# Patient Record
Sex: MALE | Race: WHITE
[De-identification: names, ages, dates, MRNs, and addresses within clinical notes are randomized per-mention and may not be internally consistent; named-entity substitution may affect disease eponyms.]

---

## 2019-12-02 ENCOUNTER — HOSPITAL ENCOUNTER (EMERGENCY)
Dept: HOSPITAL 84 - D.ER | Age: 44
Discharge: HOME | End: 2019-12-02
Payer: COMMERCIAL

## 2019-12-02 VITALS — WEIGHT: 265.56 LBS | HEIGHT: 71 IN | BODY MASS INDEX: 37.18 KG/M2

## 2019-12-02 VITALS — DIASTOLIC BLOOD PRESSURE: 81 MMHG | SYSTOLIC BLOOD PRESSURE: 133 MMHG

## 2019-12-02 DIAGNOSIS — I10: ICD-10-CM

## 2019-12-02 DIAGNOSIS — R20.2: Primary | ICD-10-CM

## 2019-12-02 DIAGNOSIS — Z79.84: ICD-10-CM

## 2019-12-02 DIAGNOSIS — E11.9: ICD-10-CM

## 2019-12-02 DIAGNOSIS — R07.89: ICD-10-CM

## 2019-12-02 LAB
ALBUMIN SERPL-MCNC: 3.5 G/DL (ref 3.4–5)
ALP SERPL-CCNC: 124 U/L (ref 46–116)
ALT SERPL-CCNC: 52 U/L (ref 10–68)
ANION GAP SERPL CALC-SCNC: 16.4 MMOL/L (ref 8–16)
APTT BLD: 27.9 SECONDS (ref 22.8–39.4)
BASOPHILS NFR BLD AUTO: 0.7 % (ref 0–2)
BILIRUB SERPL-MCNC: 0.29 MG/DL (ref 0.2–1.3)
BUN SERPL-MCNC: 18 MG/DL (ref 7–18)
CALCIUM SERPL-MCNC: 9.3 MG/DL (ref 8.5–10.1)
CHLORIDE SERPL-SCNC: 98 MMOL/L (ref 98–107)
CK MB SERPL-MCNC: 2 U/L (ref 0–3.6)
CK SERPL-CCNC: 185 UL (ref 21–232)
CO2 SERPL-SCNC: 26 MMOL/L (ref 21–32)
CREAT SERPL-MCNC: 1 MG/DL (ref 0.6–1.3)
EOSINOPHIL NFR BLD: 5.3 % (ref 0–7)
ERYTHROCYTE [DISTWIDTH] IN BLOOD BY AUTOMATED COUNT: 12.6 % (ref 11.5–14.5)
GLOBULIN SER-MCNC: 4.2 G/L
GLUCOSE SERPL-MCNC: 381 MG/DL (ref 74–106)
HCT VFR BLD CALC: 46.8 % (ref 42–54)
HGB BLD-MCNC: 15.8 G/DL (ref 13.5–17.5)
IMM GRANULOCYTES NFR BLD: 1.8 % (ref 0–5)
INR PPP: 0.93 (ref 0.85–1.17)
LYMPHOCYTES NFR BLD AUTO: 36.7 % (ref 15–50)
MAGNESIUM SERPL-MCNC: 1.8 MG/DL (ref 1.8–2.4)
MCH RBC QN AUTO: 30.9 PG (ref 26–34)
MCHC RBC AUTO-ENTMCNC: 33.8 G/DL (ref 31–37)
MCV RBC: 91.4 FL (ref 80–100)
MONOCYTES NFR BLD: 8.9 % (ref 2–11)
NEUTROPHILS NFR BLD AUTO: 46.6 % (ref 40–80)
OSMOLALITY SERPL CALC.SUM OF ELEC: 289 MOSM/KG (ref 275–300)
PLATELET # BLD: 272 10X3/UL (ref 130–400)
PMV BLD AUTO: 9.8 FL (ref 7.4–10.4)
POTASSIUM SERPL-SCNC: 4.4 MMOL/L (ref 3.5–5.1)
PROT SERPL-MCNC: 7.7 G/DL (ref 6.4–8.2)
PROTHROMBIN TIME: 11.9 SECONDS (ref 11.6–15)
RBC # BLD AUTO: 5.12 10X6/UL (ref 4.2–6.1)
SODIUM SERPL-SCNC: 136 MMOL/L (ref 136–145)
TROPONIN I SERPL-MCNC: < 0.017 NG/ML (ref 0–0.06)
WBC # BLD AUTO: 12.1 10X3/UL (ref 4.8–10.8)

## 2020-09-22 ENCOUNTER — HOSPITAL ENCOUNTER (INPATIENT)
Dept: HOSPITAL 84 - D.ER | Age: 45
LOS: 5 days | Discharge: HOME | DRG: 177 | End: 2020-09-27
Attending: FAMILY MEDICINE | Admitting: FAMILY MEDICINE
Payer: COMMERCIAL

## 2020-09-22 VITALS — DIASTOLIC BLOOD PRESSURE: 71 MMHG | SYSTOLIC BLOOD PRESSURE: 115 MMHG

## 2020-09-22 VITALS — SYSTOLIC BLOOD PRESSURE: 103 MMHG | DIASTOLIC BLOOD PRESSURE: 56 MMHG

## 2020-09-22 VITALS
HEIGHT: 71 IN | HEIGHT: 71 IN | BODY MASS INDEX: 37.1 KG/M2 | WEIGHT: 265 LBS | BODY MASS INDEX: 37.1 KG/M2 | WEIGHT: 265 LBS | BODY MASS INDEX: 37.1 KG/M2

## 2020-09-22 VITALS — DIASTOLIC BLOOD PRESSURE: 95 MMHG | SYSTOLIC BLOOD PRESSURE: 162 MMHG

## 2020-09-22 DIAGNOSIS — J12.89: ICD-10-CM

## 2020-09-22 DIAGNOSIS — I10: ICD-10-CM

## 2020-09-22 DIAGNOSIS — E11.9: ICD-10-CM

## 2020-09-22 DIAGNOSIS — U07.1: Primary | ICD-10-CM

## 2020-09-22 DIAGNOSIS — E66.9: ICD-10-CM

## 2020-09-22 DIAGNOSIS — J96.01: ICD-10-CM

## 2020-09-22 LAB
ALBUMIN SERPL-MCNC: 3.3 G/DL (ref 3.4–5)
ALP SERPL-CCNC: 74 U/L (ref 30–120)
ALT SERPL-CCNC: 67 U/L (ref 10–68)
ANION GAP SERPL CALC-SCNC: 13.2 MMOL/L (ref 8–16)
APTT BLD: 30.6 SECONDS (ref 22.8–39.4)
BASOPHILS NFR BLD AUTO: 0.3 % (ref 0–2)
BILIRUB SERPL-MCNC: 0.41 MG/DL (ref 0.2–1.3)
BUN SERPL-MCNC: 15 MG/DL (ref 7–18)
CALCIUM SERPL-MCNC: 8.9 MG/DL (ref 8.5–10.1)
CHLORIDE SERPL-SCNC: 98 MMOL/L (ref 98–107)
CK MB SERPL-MCNC: 0.9 U/L (ref 0–3.6)
CK SERPL-CCNC: 488 UL (ref 21–232)
CO2 SERPL-SCNC: 26.9 MMOL/L (ref 21–32)
CREAT SERPL-MCNC: 1 MG/DL (ref 0.6–1.3)
EOSINOPHIL NFR BLD: 0.2 % (ref 0–7)
ERYTHROCYTE [DISTWIDTH] IN BLOOD BY AUTOMATED COUNT: 12.5 % (ref 11.5–14.5)
GLOBULIN SER-MCNC: 4.5 G/L
GLUCOSE SERPL-MCNC: 278 MG/DL (ref 74–106)
HCT VFR BLD CALC: 43.6 % (ref 42–54)
HGB BLD-MCNC: 14.6 G/DL (ref 13.5–17.5)
IMM GRANULOCYTES NFR BLD: 0.6 % (ref 0–5)
INR PPP: 0.94 (ref 0.85–1.17)
LYMPHOCYTES NFR BLD AUTO: 13.1 % (ref 15–50)
MAGNESIUM SERPL-MCNC: 1.7 MG/DL (ref 1.8–2.4)
MCH RBC QN AUTO: 29.9 PG (ref 26–34)
MCHC RBC AUTO-ENTMCNC: 33.5 G/DL (ref 31–37)
MCV RBC: 89.2 FL (ref 80–100)
MONOCYTES NFR BLD: 3.5 % (ref 2–11)
NEUTROPHILS NFR BLD AUTO: 82.3 % (ref 40–80)
OSMOLALITY SERPL CALC.SUM OF ELEC: 278 MOSM/KG (ref 275–300)
PHOSPHATE SERPL-MCNC: 3.3 MG/DL (ref 2.5–4.9)
PLATELET # BLD: 206 10X3/UL (ref 130–400)
PMV BLD AUTO: 9.3 FL (ref 7.4–10.4)
POTASSIUM SERPL-SCNC: 4.1 MMOL/L (ref 3.5–5.1)
PROT SERPL-MCNC: 7.8 G/DL (ref 6.4–8.2)
PROTHROMBIN TIME: 12.5 SECONDS (ref 11.6–15)
RBC # BLD AUTO: 4.89 10X6/UL (ref 4.2–6.1)
SODIUM SERPL-SCNC: 134 MMOL/L (ref 136–145)
TROPONIN I SERPL-MCNC: < 0.017 NG/ML (ref 0–0.06)
WBC # BLD AUTO: 9.7 10X3/UL (ref 4.8–10.8)

## 2020-09-22 NOTE — NUR
RECEIVED PT FROM ER. PT IS AAO AND UP AD DELMAR. NO S/S OF DISTRESS NOTED. RR
EVEN AND TACHYPNIC ON 2.5L 02. VSS AND WNL. NS INFUSING @75ML/HR VIA L.FOR
PIV. QUICKSTART, HISTORY, ASSESSMENT, AND MED REQ COMPLETED. PT DENIES ANY
NEEDS AT THIS TIME. WILL CTM.

## 2020-09-23 VITALS — SYSTOLIC BLOOD PRESSURE: 141 MMHG | DIASTOLIC BLOOD PRESSURE: 82 MMHG

## 2020-09-23 VITALS — SYSTOLIC BLOOD PRESSURE: 144 MMHG | DIASTOLIC BLOOD PRESSURE: 79 MMHG

## 2020-09-23 VITALS — SYSTOLIC BLOOD PRESSURE: 148 MMHG | DIASTOLIC BLOOD PRESSURE: 85 MMHG

## 2020-09-23 VITALS — SYSTOLIC BLOOD PRESSURE: 144 MMHG | DIASTOLIC BLOOD PRESSURE: 91 MMHG

## 2020-09-23 LAB
ALBUMIN SERPL-MCNC: 2.9 G/DL (ref 3.4–5)
ALP SERPL-CCNC: 64 U/L (ref 30–120)
ALT SERPL-CCNC: 58 U/L (ref 10–68)
ANION GAP SERPL CALC-SCNC: 15.2 MMOL/L (ref 8–16)
BACTERIA #/AREA URNS HPF: (no result) HPF
BILIRUB SERPL-MCNC: 0.36 MG/DL (ref 0.2–1.3)
BILIRUB SERPL-MCNC: NEGATIVE MG/DL
BUN SERPL-MCNC: 18 MG/DL (ref 7–18)
CALCIUM SERPL-MCNC: 8.9 MG/DL (ref 8.5–10.1)
CHLORIDE SERPL-SCNC: 100 MMOL/L (ref 98–107)
CO2 SERPL-SCNC: 25.4 MMOL/L (ref 21–32)
CREAT SERPL-MCNC: 1 MG/DL (ref 0.6–1.3)
CRP SERPL-MCNC: 12.3 MG/DL (ref 0–0.9)
ERYTHROCYTE [DISTWIDTH] IN BLOOD BY AUTOMATED COUNT: 12.4 % (ref 11.5–14.5)
ERYTHROCYTE [SEDIMENTATION RATE] IN BLOOD: 52 MM/HR (ref 0–15)
FERRITIN SERPL-MCNC: 727 NG/ML (ref 3–244)
FERRITIN SERPL-MCNC: 763 NG/ML (ref 3–244)
GLOBULIN SER-MCNC: 4.2 G/L
GLUCOSE SERPL-MCNC: 308 MG/DL (ref 74–106)
HCT VFR BLD CALC: 42 % (ref 42–54)
HGB BLD-MCNC: 14.4 G/DL (ref 13.5–17.5)
KETONES UR STRIP-MCNC: (no result) MG/DL
LDH1 SERPL-CCNC: 415 U/L (ref 85–227)
LYMPHOCYTES NFR BLD AUTO: 7 % (ref 15–50)
MCH RBC QN AUTO: 30.4 PG (ref 26–34)
MCHC RBC AUTO-ENTMCNC: 34.3 G/DL (ref 31–37)
MCV RBC: 88.8 FL (ref 80–100)
MONOCYTES NFR BLD: 2 % (ref 2–11)
NEUTROPHILS NFR BLD AUTO: 80 % (ref 40–80)
NITRITE UR-MCNC: NEGATIVE MG/ML
NT-PROBNP SERPL-MCNC: 218 PG/ML (ref 0–125)
OSMOLALITY SERPL CALC.SUM OF ELEC: 285 MOSM/KG (ref 275–300)
PH UR STRIP: 5 [PH] (ref 5–8)
PLATELET # BLD EST: NORMAL 10*3/UL
PLATELET # BLD: 223 10X3/UL (ref 130–400)
PMV BLD AUTO: 9.6 FL (ref 7.4–10.4)
POTASSIUM SERPL-SCNC: 4.6 MMOL/L (ref 3.5–5.1)
PROT SERPL-MCNC: 7.1 G/DL (ref 6.4–8.2)
RBC # BLD AUTO: 4.73 10X6/UL (ref 4.2–6.1)
SODIUM SERPL-SCNC: 136 MMOL/L (ref 136–145)
SQUAMOUS #/AREA URNS HPF: (no result) /HPF (ref 0–5)
UROBILINOGEN UR-MCNC: NORMAL MG/DL (ref ?–2)
WBC # BLD AUTO: 6.8 10X3/UL (ref 4.8–10.8)
WBC #/AREA URNS HPF: (no result) HPF (ref 0–1)

## 2020-09-23 PROCEDURE — XW033E5 INTRODUCTION OF REMDESIVIR ANTI-INFECTIVE INTO PERIPHERAL VEIN, PERCUTANEOUS APPROACH, NEW TECHNOLOGY GROUP 5: ICD-10-PCS | Performed by: FAMILY MEDICINE

## 2020-09-23 NOTE — NUR
LYING IN BED AWAKE, ALERT, ORIENTED. RESP EVEN AND UNLABORED W/02@2L/NC IN
PROGRESS, ICE WATER BROUGHT TO ROOM/REQUEST--DENIES ANY FURTHER NEEDS AT THIS
TIME.

## 2020-09-23 NOTE — NUR
Spoke with Nurse at [facility] and have verbal verification of
COVID-19 test positive on 9-.  Awaiting faxed confirmation.

## 2020-09-24 VITALS — SYSTOLIC BLOOD PRESSURE: 143 MMHG | DIASTOLIC BLOOD PRESSURE: 88 MMHG

## 2020-09-24 VITALS — DIASTOLIC BLOOD PRESSURE: 88 MMHG | SYSTOLIC BLOOD PRESSURE: 152 MMHG

## 2020-09-24 VITALS — SYSTOLIC BLOOD PRESSURE: 153 MMHG | DIASTOLIC BLOOD PRESSURE: 88 MMHG

## 2020-09-24 VITALS — SYSTOLIC BLOOD PRESSURE: 149 MMHG | DIASTOLIC BLOOD PRESSURE: 91 MMHG

## 2020-09-24 VITALS — SYSTOLIC BLOOD PRESSURE: 130 MMHG | DIASTOLIC BLOOD PRESSURE: 73 MMHG

## 2020-09-24 VITALS — SYSTOLIC BLOOD PRESSURE: 125 MMHG | DIASTOLIC BLOOD PRESSURE: 71 MMHG

## 2020-09-24 LAB
ALT SERPL-CCNC: 42 U/L (ref 10–68)
ANION GAP SERPL CALC-SCNC: 11.1 MMOL/L (ref 8–16)
BASOPHILS NFR BLD AUTO: 0.2 % (ref 0–2)
BUN SERPL-MCNC: 20 MG/DL (ref 7–18)
CALCIUM SERPL-MCNC: 7.5 MG/DL (ref 8.5–10.1)
CHLORIDE SERPL-SCNC: 107 MMOL/L (ref 98–107)
CO2 SERPL-SCNC: 22.7 MMOL/L (ref 21–32)
CREAT SERPL-MCNC: 0.7 MG/DL (ref 0.6–1.3)
EOSINOPHIL NFR BLD: 0 % (ref 0–7)
ERYTHROCYTE [DISTWIDTH] IN BLOOD BY AUTOMATED COUNT: 12.4 % (ref 11.5–14.5)
GLUCOSE SERPL-MCNC: 276 MG/DL (ref 74–106)
HCT VFR BLD CALC: 37.2 % (ref 42–54)
HGB BLD-MCNC: 12.4 G/DL (ref 13.5–17.5)
IMM GRANULOCYTES NFR BLD: 0.7 % (ref 0–5)
LYMPHOCYTES NFR BLD AUTO: 10.1 % (ref 15–50)
MAGNESIUM SERPL-MCNC: 1.9 MG/DL (ref 1.8–2.4)
MCH RBC QN AUTO: 29.3 PG (ref 26–34)
MCHC RBC AUTO-ENTMCNC: 33.3 G/DL (ref 31–37)
MCV RBC: 87.9 FL (ref 80–100)
MONOCYTES NFR BLD: 6 % (ref 2–11)
NEUTROPHILS NFR BLD AUTO: 83 % (ref 40–80)
OSMOLALITY SERPL CALC.SUM OF ELEC: 286 MOSM/KG (ref 275–300)
PHOSPHATE SERPL-MCNC: 3.1 MG/DL (ref 2.5–4.9)
PLATELET # BLD: 246 10X3/UL (ref 130–400)
PMV BLD AUTO: 8.8 FL (ref 7.4–10.4)
POTASSIUM SERPL-SCNC: 3.8 MMOL/L (ref 3.5–5.1)
RBC # BLD AUTO: 4.23 10X6/UL (ref 4.2–6.1)
SODIUM SERPL-SCNC: 137 MMOL/L (ref 136–145)
WBC # BLD AUTO: 9.9 10X3/UL (ref 4.8–10.8)

## 2020-09-24 NOTE — NUR
AT 1128 TODAY DELIA NORRIS/VIKY CONTACTED THIS NURSE W/V.O. TO CHANGE PT'S
02 TO 2L/NC MONITOR 02 SAT AND WEAN TO RA IF POSSIBLE.

## 2020-09-24 NOTE — NUR
1230 TODAY--PT'S 02 SAT 98% ON RA AFTER BEING OFF 02 FOR APPROX 1 HOUR--PT
DENIES ANY SOB OR ANY OTHER PROBLEMS AT THIS TIME.

## 2020-09-24 NOTE — NUR
ASSUMED CARE OF PATIENT FROM PREVIOUS NURSE. GLASSES ON. WATCHING TV. ON ROOM
AIR WITH O2 SAT OF 97%. IS AT BEDSIDE WITH VOLUME WHEN USING IT IS 2100 ML.
INSTRUCTED TO USE EVERY HOUR WHILE AWAKE. LUNGS CLEAR WHEN LISTENING TO THEM.
LEFT HAND SEEN WITH SALINE LOCK, LEFT FA PIV WITH NS INFUSING AT 75 CC/HR. IN
COVID DROPLET ISOLATION. DENIES NEEDS AT THIS TIME. CALL LIGHT IN USE. TX late, not called to TERRI. RT Quin in ED RT Ifarhania in OBS with tx's and RT Maria G in cath lab with vent pt

## 2020-09-25 VITALS — SYSTOLIC BLOOD PRESSURE: 97 MMHG | DIASTOLIC BLOOD PRESSURE: 39 MMHG

## 2020-09-25 VITALS — DIASTOLIC BLOOD PRESSURE: 85 MMHG | SYSTOLIC BLOOD PRESSURE: 147 MMHG

## 2020-09-25 VITALS — SYSTOLIC BLOOD PRESSURE: 141 MMHG | DIASTOLIC BLOOD PRESSURE: 85 MMHG

## 2020-09-25 VITALS — DIASTOLIC BLOOD PRESSURE: 78 MMHG | SYSTOLIC BLOOD PRESSURE: 130 MMHG

## 2020-09-25 VITALS — DIASTOLIC BLOOD PRESSURE: 95 MMHG | SYSTOLIC BLOOD PRESSURE: 166 MMHG

## 2020-09-25 VITALS — SYSTOLIC BLOOD PRESSURE: 162 MMHG | DIASTOLIC BLOOD PRESSURE: 82 MMHG

## 2020-09-25 LAB
ALT SERPL-CCNC: 46 U/L (ref 10–68)
ANION GAP SERPL CALC-SCNC: 13.6 MMOL/L (ref 8–16)
BASOPHILS NFR BLD AUTO: 0.1 % (ref 0–2)
BUN SERPL-MCNC: 20 MG/DL (ref 7–18)
CALCIUM SERPL-MCNC: 8.5 MG/DL (ref 8.5–10.1)
CHLORIDE SERPL-SCNC: 100 MMOL/L (ref 98–107)
CO2 SERPL-SCNC: 22.4 MMOL/L (ref 21–32)
CREAT SERPL-MCNC: 0.9 MG/DL (ref 0.6–1.3)
CRP SERPL-MCNC: 2.1 MG/DL (ref 0–0.9)
EOSINOPHIL NFR BLD: 0 % (ref 0–7)
ERYTHROCYTE [DISTWIDTH] IN BLOOD BY AUTOMATED COUNT: 12.4 % (ref 11.5–14.5)
FERRITIN SERPL-MCNC: 616 NG/ML (ref 3–244)
GLUCOSE SERPL-MCNC: 377 MG/DL (ref 74–106)
HCT VFR BLD CALC: 40.2 % (ref 42–54)
HGB BLD-MCNC: 13.4 G/DL (ref 13.5–17.5)
IMM GRANULOCYTES NFR BLD: 1.4 % (ref 0–5)
LYMPHOCYTES NFR BLD AUTO: 10.1 % (ref 15–50)
MAGNESIUM SERPL-MCNC: 2.1 MG/DL (ref 1.8–2.4)
MCH RBC QN AUTO: 29.5 PG (ref 26–34)
MCHC RBC AUTO-ENTMCNC: 33.3 G/DL (ref 31–37)
MCV RBC: 88.4 FL (ref 80–100)
MONOCYTES NFR BLD: 7.2 % (ref 2–11)
NEUTROPHILS NFR BLD AUTO: 81.2 % (ref 40–80)
OSMOLALITY SERPL CALC.SUM OF ELEC: 282 MOSM/KG (ref 275–300)
PHOSPHATE SERPL-MCNC: 3.3 MG/DL (ref 2.5–4.9)
PLATELET # BLD: 327 10X3/UL (ref 130–400)
PMV BLD AUTO: 9 FL (ref 7.4–10.4)
POTASSIUM SERPL-SCNC: 4 MMOL/L (ref 3.5–5.1)
RBC # BLD AUTO: 4.55 10X6/UL (ref 4.2–6.1)
SODIUM SERPL-SCNC: 132 MMOL/L (ref 136–145)
WBC # BLD AUTO: 10.1 10X3/UL (ref 4.8–10.8)

## 2020-09-25 NOTE — NUR
NS HUNG AS ORDERED. PULSE OX CHECKED WITH PATIENT ON 2L PER NC WITH RESULTS OF
97%. I HAD WOKEN HIM UP. DENIES ANY NEEDS AT THIS TIME.

## 2020-09-25 NOTE — NUR
Nutrition Follow-up:
Pt in droplet isolation; covid-19+. Chart reviewed. Nursing reports pt eating
well.
Diet: Diabetic
Wt: 265# (9/23)
Labs noted: Na 132, Glu 377
Meds noted: Glucophage, Glucotrol, Protonix, zinc sulfate, vitamin D, vitamin
            C, NS @ 75
-RD following.

## 2020-09-26 VITALS — SYSTOLIC BLOOD PRESSURE: 149 MMHG | DIASTOLIC BLOOD PRESSURE: 91 MMHG

## 2020-09-26 VITALS — DIASTOLIC BLOOD PRESSURE: 89 MMHG | SYSTOLIC BLOOD PRESSURE: 161 MMHG

## 2020-09-26 VITALS — SYSTOLIC BLOOD PRESSURE: 154 MMHG | DIASTOLIC BLOOD PRESSURE: 95 MMHG

## 2020-09-26 VITALS — SYSTOLIC BLOOD PRESSURE: 134 MMHG | DIASTOLIC BLOOD PRESSURE: 82 MMHG

## 2020-09-26 VITALS — DIASTOLIC BLOOD PRESSURE: 81 MMHG | SYSTOLIC BLOOD PRESSURE: 139 MMHG

## 2020-09-26 LAB
ALT SERPL-CCNC: 49 U/L (ref 10–68)
ANION GAP SERPL CALC-SCNC: 14.6 MMOL/L (ref 8–16)
BASOPHILS NFR BLD AUTO: 0.3 % (ref 0–2)
BUN SERPL-MCNC: 22 MG/DL (ref 7–18)
CALCIUM SERPL-MCNC: 8.7 MG/DL (ref 8.5–10.1)
CHLORIDE SERPL-SCNC: 101 MMOL/L (ref 98–107)
CO2 SERPL-SCNC: 20.8 MMOL/L (ref 21–32)
CREAT SERPL-MCNC: 0.8 MG/DL (ref 0.6–1.3)
EOSINOPHIL NFR BLD: 0 % (ref 0–7)
ERYTHROCYTE [DISTWIDTH] IN BLOOD BY AUTOMATED COUNT: 12.4 % (ref 11.5–14.5)
GLUCOSE SERPL-MCNC: 388 MG/DL (ref 74–106)
HCT VFR BLD CALC: 42.6 % (ref 42–54)
HGB BLD-MCNC: 14.3 G/DL (ref 13.5–17.5)
IMM GRANULOCYTES NFR BLD: 2.6 % (ref 0–5)
LYMPHOCYTES NFR BLD AUTO: 8.7 % (ref 15–50)
MAGNESIUM SERPL-MCNC: 2 MG/DL (ref 1.8–2.4)
MCH RBC QN AUTO: 29.6 PG (ref 26–34)
MCHC RBC AUTO-ENTMCNC: 33.6 G/DL (ref 31–37)
MCV RBC: 88.2 FL (ref 80–100)
MONOCYTES NFR BLD: 6.8 % (ref 2–11)
NEUTROPHILS NFR BLD AUTO: 81.6 % (ref 40–80)
OSMOLALITY SERPL CALC.SUM OF ELEC: 283 MOSM/KG (ref 275–300)
PHOSPHATE SERPL-MCNC: 3.6 MG/DL (ref 2.5–4.9)
PLATELET # BLD: 358 10X3/UL (ref 130–400)
PMV BLD AUTO: 9.1 FL (ref 7.4–10.4)
POTASSIUM SERPL-SCNC: 4.4 MMOL/L (ref 3.5–5.1)
RBC # BLD AUTO: 4.83 10X6/UL (ref 4.2–6.1)
SODIUM SERPL-SCNC: 132 MMOL/L (ref 136–145)
WBC # BLD AUTO: 11.7 10X3/UL (ref 4.8–10.8)

## 2020-09-27 VITALS — DIASTOLIC BLOOD PRESSURE: 89 MMHG | SYSTOLIC BLOOD PRESSURE: 154 MMHG

## 2020-09-27 VITALS — SYSTOLIC BLOOD PRESSURE: 152 MMHG | DIASTOLIC BLOOD PRESSURE: 90 MMHG

## 2020-09-27 VITALS — SYSTOLIC BLOOD PRESSURE: 158 MMHG | DIASTOLIC BLOOD PRESSURE: 97 MMHG

## 2020-09-27 LAB
ALT SERPL-CCNC: 59 U/L (ref 10–68)
ANION GAP SERPL CALC-SCNC: 14.2 MMOL/L (ref 8–16)
BASOPHILS NFR BLD AUTO: 0.4 % (ref 0–2)
BUN SERPL-MCNC: 19 MG/DL (ref 7–18)
CALCIUM SERPL-MCNC: 9.1 MG/DL (ref 8.5–10.1)
CHLORIDE SERPL-SCNC: 99 MMOL/L (ref 98–107)
CO2 SERPL-SCNC: 23.2 MMOL/L (ref 21–32)
CREAT SERPL-MCNC: 0.9 MG/DL (ref 0.6–1.3)
EOSINOPHIL NFR BLD: 0 % (ref 0–7)
ERYTHROCYTE [DISTWIDTH] IN BLOOD BY AUTOMATED COUNT: 12.1 % (ref 11.5–14.5)
GLUCOSE SERPL-MCNC: 336 MG/DL (ref 74–106)
HCT VFR BLD CALC: 42.9 % (ref 42–54)
HGB BLD-MCNC: 14.5 G/DL (ref 13.5–17.5)
IMM GRANULOCYTES NFR BLD: 4.3 % (ref 0–5)
LYMPHOCYTES NFR BLD AUTO: 10.5 % (ref 15–50)
MAGNESIUM SERPL-MCNC: 2.2 MG/DL (ref 1.8–2.4)
MCH RBC QN AUTO: 29.3 PG (ref 26–34)
MCHC RBC AUTO-ENTMCNC: 33.8 G/DL (ref 31–37)
MCV RBC: 86.7 FL (ref 80–100)
MONOCYTES NFR BLD: 8.1 % (ref 2–11)
NEUTROPHILS NFR BLD AUTO: 76.7 % (ref 40–80)
OSMOLALITY SERPL CALC.SUM OF ELEC: 279 MOSM/KG (ref 275–300)
PHOSPHATE SERPL-MCNC: 4.7 MG/DL (ref 2.5–4.9)
PLATELET # BLD: 385 10X3/UL (ref 130–400)
PMV BLD AUTO: 9 FL (ref 7.4–10.4)
POTASSIUM SERPL-SCNC: 4.4 MMOL/L (ref 3.5–5.1)
RBC # BLD AUTO: 4.95 10X6/UL (ref 4.2–6.1)
SODIUM SERPL-SCNC: 132 MMOL/L (ref 136–145)
WBC # BLD AUTO: 11.4 10X3/UL (ref 4.8–10.8)

## 2020-09-27 NOTE — MORECARE
CASE MANAGEMENT DISCHARGE SUMMARY
 
 
PATIENT: BETO JUÁREZ            UNIT: [id number]
ACCOUNT#: [id number]                       ADM DATE: 20
AGE: 45     : [birth year]  SEX: M            ROOM/BED: D.2101    
AUTHOR: SHANNON RITTER                             PHYSICIAN:                               
 
REFERRING PHYSICIAN: PAMELA CHAMBERS MD          
DATE OF SERVICE: 20
Discharge Plan
 
 
Patient Name: BETO JUÁREZ
Facility: [facility]:[place]
Encounter #: [id number]
Medical Record #: [id number]
: 1975
Planned Disposition: Home
Anticipated Discharge Date: 20
 
Discharge Date: 2020
Expected LOS: 5
Initial Reviewer: [email or web address]
Initial Review Date: 2020
Generated: 20   2:48 pm 
  
 
 
 
 
 
 
 
Patient Name: BETO JUÁREZ
 
Encounter #: [id number]
Page [id number]
 
 
 
 
 
Electronically Signed by SHANNON RITTER on 20 at 1349
 
 
 
 
 
 
**All edits/amendments must be made on the electronic document**
 
DICTATION DATE: 20 1348     : LAI  20 1348     
RPT#: [id number]                                DC DATE:20
                                               STATUS: DIS IN  
[facility]
[address] [place] [address]
***END OF REPORT***

## 2020-09-27 NOTE — NUR
REMDESIVIR HUNG AT THIS TIME. PT UP TO CHAIRM, DENIES ANY NEEDS AT THIS TIME.
CALL LIGHT IN REACH, NAD NOTED, WILL CONTINUE TO MONITOR.

## 2020-09-27 NOTE — NUR
PROVIDED VERBAL AND WRITTEN DISCHARGE TEACHING TO PT, WHO VERBALIZED
UNDERSTANDING REGARDING TEACHING. D/C LT FA AND LT HAND IV WITH CATHETER TIP
INTACT. PT REFUSED BEING WHEELED OUT BY WHEELCHAIR, LEFT UNIT VIA AMBULATORY
WITH ALL BELONGIGNS, NAD NOTED.

## 2020-09-27 NOTE — MORECARE
CASE MANAGEMENT DISCHARGE SUMMARY
 
 
PATIENT: BETO JUÁREZ            UNIT: [id number]
ACCOUNT#: [id number]                       ADM DATE: 20
AGE: 45     : [birth year]  SEX: M            ROOM/BED: D.2107    
AUTHOR: STONEDOC                             PHYSICIAN:                               
 
REFERRING PHYSICIAN: PAMELA CHAMBERS MD          
DATE OF SERVICE: 20
Discharge Plan
 
 
Patient Name: BETO JUÁREZ
Facility: [facility]:[place]
Encounter #: [id number]
Medical Record #: [id number]
: 1975
Planned Disposition: Home
Anticipated Discharge Date: 20
 
Discharge Date: 2020
Expected LOS: 5
Initial Reviewer: SOLO
Initial Review Date: 2020
Generated: 20   2:55 pm 
Comments
 
DCP- Discharge Planning
 
Updated by SOLO: Kailash Esquivel on 20  12:52 pm CT
Patient Name: BETO JUÁREZ                                     
Admission Status: ER   
Accout number: [id number]                              
Admission Date: 2020   
: 1975                                                        
Admission Diagnosis:COVID-19   
Attending: REYES,                                                
Current LOS:  5   
  
Anticipated DC Date: 2020   
Planned Disposition: Home   
Primary Insurance: [employer]   
  
  
Discharge Planning Comments:   
CM met with patient via telephone per current COVID directives to complete 
initial dc planning assessment.  CM educated patient on the CM role and 
verbal consent was given by patient to complete assessment.  CM verified 
patient's address, phone number, and emergency contact phone numbers.  
 
Patient lives at home with his spouse Maye Juárez ([phone]).  At 
discharge patient plans to return home and feels this is a safe discharge. 
The patient has 0 steps to navigate to enter the home and it is safe.   
Patient fills his medications at [facility] in [place]. CM discussed 
availability of home health, rehab services, and medical equipment. Patient 
declined HHS, SNF, IPR, and DME. No other known discharge needs were 
discussed at this time. Transportation provider at discharge will be with his 
spouse Maye. CM will continue to follow and will assist as needed with dc 
plans/needs.  
  
: Kailash Esquivel
 DCPIA - Discharge Planning Initial Assessment
 
Updated by [email or web address]: Kailash Esquivel on 20   1:49 pm
*  Is the patient Alert and Oriented?
Yes
*  How many steps to enter\exit or inside your home? 0/0 *  PCP NONE at this time * 
Pharmacy
[facility]
*  Preadmission Environment
Home with Family
*  ADLs
Independent
*  Equipment
None
*  Other Equipment
n/a
*  List name and contact numbers for known caregivers / representatives who 
currently or will assist patient after discharge:
Maye Juárez (Spouse) [phone]
*  Verbal permission to speak to the caregivers and representatives has been 
obtained from the patient.
Yes
*  Community resources currently utilized
None
*  Please name any agencies selected above.
n/a
*  Additional services required to return to the preadmission environment?
No
*  Can the patient safely return to the preadmission environment?
Yes
*  Has this patient been hospitalized within the prior 30 days at any 
hospital?
No
 
 
 
 
 
Coverage Notice
 
Reviewer: [id number] - Kailash Mathurnes
 
 
Notice Issued Date-Time: 2020  10:36
Notice Type: Patient Choice Letter
 
Notice Delivered To: Patient
Relationship to Patient: Self
Representative Name: 
 
Delivery Method: HAND - Hand Delivered
Reina Days:
Prior Verbal Notification: 
 
Recipient Understood Notice: Yes
Recipient Signature: Yes
Med Rec Note Co-signed by Attending:
 
Coverage Notice Comment:  0 DME/SNF/HHS/IPR
 
Last DP export: 20  12:49 p
Patient Name: BETO JUÁREZ
Encounter #: [id number]
Page [phone]
 
 
 
 
 
Electronically Signed by SHANNON RITTER on 20 at 1356
 
 
 
 
 
 
**All edits/amendments must be made on the electronic document**
 
DICTATION DATE: 20 1356     : LAI  20 1356     
RPT#: [id number]                                DC DATE:20
                                               STATUS: DIS IN  
[facility]
[address] [place] [address]
***END OF REPORT***

## 2020-09-28 NOTE — MORECARE
CASE MANAGEMENT DISCHARGE SUMMARY
 
 
PATIENT: BETO JUÁREZ            UNIT: [id number]
ACCOUNT#: [id number]                       ADM DATE: 20
AGE: 45     : 77  SEX: M            ROOM/BED: D.2100    
AUTHOR: STONEDOC                             PHYSICIAN:                               
 
REFERRING PHYSICIAN: PAMELA CHAMBERS MD          
DATE OF SERVICE: 20
Discharge Plan
 
 
Patient Name: BETO JUÁREZ
Facility: [facility]:[place]
Encounter #: [id number]
Medical Record #: [id number]
: 1975
Planned Disposition: Home
Anticipated Discharge Date: 20
 
Discharge Date: 2020
Expected LOS: 5
Initial Reviewer: SOLO
Initial Review Date: 2020
Generated: 20   9:50 am 
Comments
 
DCP- Discharge Planning
 
Updated by SOLO: Kailash Esquivel on 20  12:52 pm CT
Patient Name: BETO JUÁREZ                                     
Admission Status: ER   
Accout number: [id number]                              
Admission Date: 2020   
: 1975                                                        
Admission Diagnosis:COVID-19   
Attending: REYES,                                                
Current LOS:  5   
  
Anticipated DC Date: 2020   
Planned Disposition: Home   
Primary Insurance: [employer]   
  
  
Discharge Planning Comments:   
CM met with patient via telephone per current COVID directives to complete 
initial dc planning assessment.  CM educated patient on the CM role and 
verbal consent was given by patient to complete assessment.  CM verified 
patient's address, phone number, and emergency contact phone numbers.  
 
Patient lives at home with his spouse Maye Juárez ([phone]).  At 
discharge patient plans to return home and feels this is a safe discharge. 
The patient has 0 steps to navigate to enter the home and it is safe.   
Patient fills his medications at [facility] in [place]. CM discussed 
availability of home health, rehab services, and medical equipment. Patient 
declined HHS, SNF, IPR, and DME. No other known discharge needs were 
discussed at this time. Transportation provider at discharge will be with his 
spouse Maye. CM will continue to follow and will assist as needed with dc 
plans/needs.  
  
: Kailash Esquivel
 DCPIA - Discharge Planning Initial Assessment
 
Updated by [id number]: Kailash Esquivel on 20   1:49 pm
*  Is the patient Alert and Oriented?
Yes
*  How many steps to enter\exit or inside your home? 0/0 *  PCP NONE at this time * 
Pharmacy
[facility]
*  Preadmission Environment
Home with Family
*  ADLs
Independent
*  Equipment
None
*  Other Equipment
n/a
*  List name and contact numbers for known caregivers / representatives who 
currently or will assist patient after discharge:
Maye Juárez (Spouse) [phone]
*  Verbal permission to speak to the caregivers and representatives has been 
obtained from the patient.
Yes
*  Community resources currently utilized
None
*  Please name any agencies selected above.
n/a
*  Additional services required to return to the preadmission environment?
No
*  Can the patient safely return to the preadmission environment?
Yes
*  Has this patient been hospitalized within the prior 30 days at any 
hospital?
No
 
 
 
 
 
Coverage Notice
 
Reviewer: [email or web address] - Kailash Esquivel
 
 
Notice Issued Date-Time: 2020  10:36
Notice Type: Patient Choice Letter
 
Notice Delivered To: Patient
Relationship to Patient: Self
Representative Name: 
 
Delivery Method: HAND - Hand Delivered
Reina Days:
Prior Verbal Notification: 
 
Recipient Understood Notice: Yes
Recipient Signature: Yes
Med Rec Note Co-signed by Attending:
 
Coverage Notice Comment:  0 DME/SNF/HHS/IPR
 
Last DP export: 20  12:56 p
Patient Name: BETO JUÁREZ
Encounter #: [id number]
Page [id number]
 
 
 
 
 
Electronically Signed by SHANNON RITTER on 20 at 0851
 
 
 
 
 
 
**All edits/amendments must be made on the electronic document**
 
DICTATION DATE: 20 0850     : LAI  2050     
RPT#: [id number]                                DC DATE:20
                                               STATUS: DIS IN  
[facility]
[address] [place] [address]
***END OF REPORT***